# Patient Record
Sex: MALE | Race: WHITE | NOT HISPANIC OR LATINO | Employment: FULL TIME | URBAN - METROPOLITAN AREA
[De-identification: names, ages, dates, MRNs, and addresses within clinical notes are randomized per-mention and may not be internally consistent; named-entity substitution may affect disease eponyms.]

---

## 2019-09-12 ENCOUNTER — OFFICE VISIT (OUTPATIENT)
Dept: SLEEP MEDICINE | Facility: HOSPITAL | Age: 69
End: 2019-09-12

## 2019-09-12 VITALS
OXYGEN SATURATION: 96 % | BODY MASS INDEX: 27.02 KG/M2 | DIASTOLIC BLOOD PRESSURE: 72 MMHG | WEIGHT: 193 LBS | HEART RATE: 89 BPM | HEIGHT: 71 IN | SYSTOLIC BLOOD PRESSURE: 118 MMHG

## 2019-09-12 DIAGNOSIS — G47.10 HYPERSOMNIA WITH SLEEP APNEA: Primary | ICD-10-CM

## 2019-09-12 DIAGNOSIS — G47.30 HYPERSOMNIA WITH SLEEP APNEA: Primary | ICD-10-CM

## 2019-09-12 DIAGNOSIS — G47.21 CIRCADIAN RHYTHM SLEEP DISORDER, DELAYED SLEEP PHASE TYPE: ICD-10-CM

## 2019-09-12 DIAGNOSIS — G47.52 REM SLEEP BEHAVIOR DISORDER: ICD-10-CM

## 2019-09-12 PROCEDURE — 99244 OFF/OP CNSLTJ NEW/EST MOD 40: CPT | Performed by: INTERNAL MEDICINE

## 2019-09-12 RX ORDER — CLONAZEPAM 0.5 MG/1
TABLET ORAL
Qty: 30 TABLET | Refills: 3 | Status: SHIPPED | OUTPATIENT
Start: 2019-09-12

## 2019-09-12 NOTE — PROGRESS NOTES
[facility] New [facility]Consultation       Reason for Consultation: REM behavior    Patient Care Team:  Bj Manning NP-C as PCP - General (Family Medicine)  Jerel Cid MD as Consulting Physician (Sleep Medicine)    Chief complaint: Sleepwalking and possibly acting out dreams    History of present illness:    Thank you for asking me to see your patient.  The patient is a 68 y.o. male who has worsening complaints of acting out of his dreams.  I last saw the patient in 2007.  Initially, clonazepam prescribed.  No follow-up ever occurred to consider overnight polysomnogram.    The patient has not taking clonazepam for several years.  He goes to bed at 10:30 PM and awakens at 5 AM.  He is sleepy upon arising.  He might take a nap for 30 minutes once a week.  Sometimes he takes a nap on weekends.  He sleeps in but is still sleepy.  The patient has difficulty driving due to sleepiness.  Sometimes he does snore.  He has nocturnal pain.  He has problems falling asleep.  Gallagher Sleepiness Scale abnormal at 10.5    The patient has had a recent hernia surgery    Review of Systems:    A complete review of systems was done and all were negative with the exception of painful joints    History:  Past Medical History:   Diagnosis Date   • Low testosterone in male    • REM sleep behavior disorder    , History reviewed. No pertinent surgical history.,   Family History   Problem Relation Age of Onset   • Heart disease Other    • COPD Other     and   Social History     Tobacco Use   • Smoking status: Former Smoker     Start date:      Last attempt to quit:      Years since quittin.7   • Smokeless tobacco: Never Used   Substance Use Topics   • Alcohol use: No     Frequency: Never   • Drug use: No       Social History: He is a .  2 or 3 caffeinated beverages a day.    Allergies:  Patient has no allergy information on record.     Medication Review:  Tadalafil, testosterone shots,  "melatonin, saw palmetto,    Vital Signs:    Vitals:    09/12/19 1108   BP: 118/72   Pulse: 89   SpO2: 96%   Weight: 87.5 kg (193 lb)   Height: 180.3 cm (71\")      Body mass index is 26.92 kg/m².  Neck Circumference: 16 inches    The patient has a similar weight dating back to 2007    Physical Exam:    Constitutional:  Well developed 68 y.o. male that appears in no apparent distress.  Awake & oriented times 3.  Normal mood with normal recent and remote memory and normal judgement.  Eyes:  Conjunctivae normal.  Oropharynx: Moist mucous membranes without exudate and a large tongue with mild to moderate narrowing of the lateral pharyngeal walls  Neck: Trachea midline  Respiratory: Effort is not labored  Cardiovascular: Radial pulse regular  Musculoskeletal: Gait appears normal, no digital clubbing evident, no pre-tibial edema      Impression:   The patient has persistent complaints that could be consistent with REM sleep behavior disorder.  Additionally, the patient has complaints of hypersomnolence with signs and symptoms suggestive of RUBIA.  Circadian rhythm sleep disorder, delayed sleep phase type    Plan:  Good sleep hygiene measures should be maintained.  Some weight loss would be beneficial in this patient who is overweight.    Pathophysiology of RUBIA described to the patient.  Cardiovascular complications of untreated RUBIA also reviewed.      After reviewing all with the patient and his wife, I would recommend restarting clonazepam 0.5 mg #30 with 3 refills.    The patient will continue melatonin.  He should take it 4 to 6 hours prior to his desired sleep time.  Dosing may be increased to up to 20 mg.    Additionally, a home sleep study will be scheduled to evaluate for RUBIA.    Thank you for requesting me to assist in this patient's care.    Jerel Cid MD  Sleep Medicine  09/12/19  11:41 AM          "

## 2019-09-14 PROBLEM — G47.30 HYPERSOMNIA WITH SLEEP APNEA: Status: ACTIVE | Noted: 2019-09-14

## 2019-09-14 PROBLEM — G47.21 CIRCADIAN RHYTHM SLEEP DISORDER, DELAYED SLEEP PHASE TYPE: Status: ACTIVE | Noted: 2019-09-14

## 2019-09-14 PROBLEM — G47.10 HYPERSOMNIA WITH SLEEP APNEA: Status: ACTIVE | Noted: 2019-09-14
